# Patient Record
Sex: FEMALE | Race: OTHER
[De-identification: names, ages, dates, MRNs, and addresses within clinical notes are randomized per-mention and may not be internally consistent; named-entity substitution may affect disease eponyms.]

---

## 2020-12-24 ENCOUNTER — HOSPITAL ENCOUNTER (INPATIENT)
Dept: HOSPITAL 54 - ER | Age: 50
LOS: 8 days | Discharge: NURSING, REHAB OR LONG-TERM CARE | DRG: 194 | End: 2021-01-01
Attending: NURSE PRACTITIONER | Admitting: INTERNAL MEDICINE
Payer: COMMERCIAL

## 2020-12-24 VITALS — WEIGHT: 293 LBS | HEIGHT: 68 IN | BODY MASS INDEX: 44.41 KG/M2

## 2020-12-24 DIAGNOSIS — J96.21: ICD-10-CM

## 2020-12-24 DIAGNOSIS — Z71.3: ICD-10-CM
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
## 2020-12-24 DIAGNOSIS — E44.0: ICD-10-CM

## 2020-12-24 DIAGNOSIS — J96.22: ICD-10-CM

## 2020-12-24 DIAGNOSIS — J44.1: ICD-10-CM

## 2020-12-24 DIAGNOSIS — D50.9: ICD-10-CM

## 2020-12-24 DIAGNOSIS — I87.2: ICD-10-CM

## 2020-12-24 DIAGNOSIS — L03.116: ICD-10-CM

## 2020-12-24 DIAGNOSIS — I11.0: Primary | ICD-10-CM

## 2020-12-24 DIAGNOSIS — G93.41: ICD-10-CM

## 2020-12-24 DIAGNOSIS — D68.69: ICD-10-CM

## 2020-12-24 DIAGNOSIS — L03.115: ICD-10-CM

## 2020-12-24 DIAGNOSIS — M20.41: ICD-10-CM
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
## 2020-12-24 DIAGNOSIS — I50.33: ICD-10-CM

## 2020-12-24 DIAGNOSIS — I87.311: ICD-10-CM

## 2020-12-24 DIAGNOSIS — L60.3: ICD-10-CM

## 2020-12-24 DIAGNOSIS — Z99.81: ICD-10-CM

## 2020-12-24 DIAGNOSIS — M20.42: ICD-10-CM
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
## 2020-12-24 DIAGNOSIS — Z20.828: ICD-10-CM

## 2020-12-24 DIAGNOSIS — L97.818: ICD-10-CM

## 2020-12-24 DIAGNOSIS — E66.2: ICD-10-CM

## 2020-12-24 LAB
ALBUMIN SERPL BCP-MCNC: 3 G/DL (ref 3.4–5)
ALP SERPL-CCNC: 90 U/L (ref 46–116)
ALT SERPL W P-5'-P-CCNC: < 6 U/L (ref 12–78)
AST SERPL W P-5'-P-CCNC: 14 U/L (ref 15–37)
BASE EXCESS BLDA CALC-SCNC: 1.6 MMOL/L
BASE EXCESS BLDA CALC-SCNC: 2.2 MMOL/L
BASOPHILS # BLD AUTO: 0 /CMM (ref 0–0.2)
BASOPHILS NFR BLD AUTO: 0.6 % (ref 0–2)
BILIRUB DIRECT SERPL-MCNC: 0.5 MG/DL (ref 0–0.2)
BILIRUB SERPL-MCNC: 0.9 MG/DL (ref 0.2–1)
BUN SERPL-MCNC: 14 MG/DL (ref 7–18)
CALCIUM SERPL-MCNC: 8.8 MG/DL (ref 8.5–10.1)
CHLORIDE SERPL-SCNC: 106 MMOL/L (ref 98–107)
CHOLEST SERPL-MCNC: 74 MG/DL (ref ?–200)
CO2 SERPL-SCNC: 35 MMOL/L (ref 21–32)
CREAT SERPL-MCNC: 1.3 MG/DL (ref 0.6–1.3)
DO-HGB MFR BLDA: 213.4 MMHG
DO-HGB MFR BLDA: 354.5 MMHG
EOSINOPHIL NFR BLD AUTO: 3.4 % (ref 0–6)
EOSINOPHIL NFR BLD MANUAL: 3 % (ref 0–4)
GLUCOSE SERPL-MCNC: 93 MG/DL (ref 74–106)
HCT VFR BLD AUTO: 33 % (ref 33–45)
HDLC SERPL-MCNC: 36 MG/DL (ref 40–60)
HGB BLD-MCNC: 9.3 G/DL (ref 11.5–14.8)
INHALED O2 CONCENTRATION: 60 %
INHALED O2 CONCENTRATION: 80 %
IRON SERPL-MCNC: 21 UG/DL (ref 50–175)
LDLC SERPL DIRECT ASSAY-MCNC: 22 MG/DL (ref 0–99)
LYMPHOCYTES NFR BLD AUTO: 0.5 /CMM (ref 0.8–4.8)
LYMPHOCYTES NFR BLD AUTO: 11 % (ref 20–44)
LYMPHOCYTES NFR BLD MANUAL: 16 % (ref 16–48)
MCHC RBC AUTO-ENTMCNC: 28 G/DL (ref 31–36)
MCV RBC AUTO: 71 FL (ref 82–100)
MONOCYTES NFR BLD AUTO: 0.4 /CMM (ref 0.1–1.3)
MONOCYTES NFR BLD AUTO: 9.3 % (ref 2–12)
MONOCYTES NFR BLD MANUAL: 12 % (ref 0–11)
NEUTROPHILS # BLD AUTO: 3.5 /CMM (ref 1.8–8.9)
NEUTROPHILS NFR BLD AUTO: 75.7 % (ref 43–81)
NEUTS SEG NFR BLD MANUAL: 69 % (ref 42–76)
NT-PROBNP SERPL-MCNC: 5975 PG/ML (ref 0–125)
PCO2 TEMP ADJ BLDA: 86 MMHG (ref 35–45)
PCO2 TEMP ADJ BLDA: 98.7 MMHG (ref 35–45)
PH TEMP ADJ BLDA: 7.14 [PH] (ref 7.35–7.45)
PH TEMP ADJ BLDA: 7.18 [PH] (ref 7.35–7.45)
PLATELET # BLD AUTO: 174 /CMM (ref 150–450)
PO2 TEMP ADJ BLDA: 112.3 MMHG (ref 75–100)
PO2 TEMP ADJ BLDA: 119.8 MMHG (ref 75–100)
POTASSIUM SERPL-SCNC: 4.1 MMOL/L (ref 3.5–5.1)
PROT SERPL-MCNC: 8.2 G/DL (ref 6.4–8.2)
RBC # BLD AUTO: 4.69 MIL/UL (ref 4–5.2)
SAO2 % BLDA: 97.1 % (ref 92–98.5)
SAO2 % BLDA: 97.7 % (ref 92–98.5)
SET RATE, BG: 16
SET RATE, BG: 24
SODIUM SERPL-SCNC: 144 MMOL/L (ref 136–145)
TIBC SERPL-MCNC: 433 UG/DL (ref 250–450)
TRIGL SERPL-MCNC: 79 MG/DL (ref 30–150)
TSH SERPL DL<=0.005 MIU/L-ACNC: 2.33 UIU/ML (ref 0.36–3.74)
VENTILATION MODE VENT: (no result)
VENTILATION MODE VENT: (no result)
WBC NRBC COR # BLD AUTO: 4.6 K/UL (ref 4.3–11)

## 2020-12-24 PROCEDURE — A6253 ABSORPT DRG > 48 SQ IN W/O B: HCPCS

## 2020-12-24 PROCEDURE — G0378 HOSPITAL OBSERVATION PER HR: HCPCS

## 2020-12-24 PROCEDURE — U0003 INFECTIOUS AGENT DETECTION BY NUCLEIC ACID (DNA OR RNA); SEVERE ACUTE RESPIRATORY SYNDROME CORONAVIRUS 2 (SARS-COV-2) (CORONAVIRUS DISEASE [COVID-19]), AMPLIFIED PROBE TECHNIQUE, MAKING USE OF HIGH THROUGHPUT TECHNOLOGIES AS DESCRIBED BY CMS-2020-01-R: HCPCS

## 2020-12-24 PROCEDURE — C9113 INJ PANTOPRAZOLE SODIUM, VIA: HCPCS

## 2020-12-24 PROCEDURE — 5A09357 ASSISTANCE WITH RESPIRATORY VENTILATION, LESS THAN 24 CONSECUTIVE HOURS, CONTINUOUS POSITIVE AIRWAY PRESSURE: ICD-10-PCS | Performed by: INTERNAL MEDICINE

## 2020-12-24 PROCEDURE — A6403 STERILE GAUZE>16 <= 48 SQ IN: HCPCS

## 2020-12-24 PROCEDURE — C9803 HOPD COVID-19 SPEC COLLECT: HCPCS

## 2020-12-24 RX ADMIN — FUROSEMIDE SCH MG: 10 INJECTION, SOLUTION INTRAMUSCULAR; INTRAVENOUS at 10:00

## 2020-12-24 RX ADMIN — ENOXAPARIN SODIUM SCH MG: 40 INJECTION SUBCUTANEOUS at 12:40

## 2020-12-24 RX ADMIN — FUROSEMIDE SCH MG: 10 INJECTION, SOLUTION INTRAMUSCULAR; INTRAVENOUS at 16:03

## 2020-12-24 RX ADMIN — ALBUTEROL SULFATE SCH MG: 2.5 SOLUTION RESPIRATORY (INHALATION) at 15:23

## 2020-12-24 RX ADMIN — DEXTROSE MONOHYDRATE SCH MLS/HR: 50 INJECTION, SOLUTION INTRAVENOUS at 12:30

## 2020-12-24 RX ADMIN — POTASSIUM CHLORIDE SCH MEQ: 1500 TABLET, EXTENDED RELEASE ORAL at 10:00

## 2020-12-24 RX ADMIN — ALBUTEROL SULFATE SCH MG: 2.5 SOLUTION RESPIRATORY (INHALATION) at 12:00

## 2020-12-24 RX ADMIN — Medication SCH MG: at 20:00

## 2020-12-24 RX ADMIN — Medication SCH MG: at 15:23

## 2020-12-24 RX ADMIN — ALBUTEROL SULFATE SCH MG: 2.5 SOLUTION RESPIRATORY (INHALATION) at 20:00

## 2020-12-24 RX ADMIN — CEFEPIME HYDROCHLORIDE SCH MLS/HR: 1 INJECTION, POWDER, FOR SOLUTION INTRAMUSCULAR; INTRAVENOUS at 11:28

## 2020-12-24 RX ADMIN — POTASSIUM CHLORIDE SCH MEQ: 1500 TABLET, EXTENDED RELEASE ORAL at 16:05

## 2020-12-24 RX ADMIN — SODIUM CHLORIDE SCH MG: 9 INJECTION, SOLUTION INTRAVENOUS at 10:00

## 2020-12-24 RX ADMIN — Medication SCH MG: at 12:00

## 2020-12-24 NOTE — NUR
repeat abgs reported to dr. lanier. 

ph 7.144

co2 98.7

o2 112.3



pt currently on bibap with settings of

pressure 25/8

rate 16

fio2 80%

## 2020-12-24 NOTE — NUR
-------------------------------------------------------------------------------

             *** Note undone in [facility] - 12/24/20 at 0506 by WONG ***             

-------------------------------------------------------------------------------

COVID SWABBED, SENT TO LAB.

## 2020-12-24 NOTE — NUR
RT



pt found on 3lnc saturating 94%. placed on bipap, more awake and alert. tolerating well. 
ambu bag at [facility].



will continue to monitor

## 2020-12-25 LAB
ALBUMIN SERPL BCP-MCNC: 2.7 G/DL (ref 3.4–5)
ALP SERPL-CCNC: 91 U/L (ref 46–116)
ALT SERPL W P-5'-P-CCNC: 8 U/L (ref 12–78)
AST SERPL W P-5'-P-CCNC: 8 U/L (ref 15–37)
BASE EXCESS BLDA CALC-SCNC: 3.3 MMOL/L
BASE EXCESS BLDA CALC-SCNC: 5.1 MMOL/L
BASOPHILS # BLD AUTO: 0 /CMM (ref 0–0.2)
BASOPHILS NFR BLD AUTO: 0.2 % (ref 0–2)
BILIRUB SERPL-MCNC: 0.8 MG/DL (ref 0.2–1)
BUN SERPL-MCNC: 13 MG/DL (ref 7–18)
CALCIUM SERPL-MCNC: 8.3 MG/DL (ref 8.5–10.1)
CHLORIDE SERPL-SCNC: 106 MMOL/L (ref 98–107)
CO2 SERPL-SCNC: 35 MMOL/L (ref 21–32)
CREAT SERPL-MCNC: 1.5 MG/DL (ref 0.6–1.3)
DO-HGB MFR BLDA: 37.4 MMHG
DO-HGB MFR BLDA: 57.6 MMHG
EOSINOPHIL NFR BLD AUTO: 2.1 % (ref 0–6)
EOSINOPHIL NFR BLD MANUAL: 6 % (ref 0–4)
GLUCOSE SERPL-MCNC: 121 MG/DL (ref 74–106)
HCT VFR BLD AUTO: 32 % (ref 33–45)
HGB BLD-MCNC: 9.1 G/DL (ref 11.5–14.8)
INHALED O2 CONCENTRATION: 28 %
INHALED O2 CONCENTRATION: 28 %
INHALED O2 FLOW RATE: 2 L/MIN (ref 0–30)
INHALED O2 FLOW RATE: 2 L/MIN (ref 0–30)
LYMPHOCYTES NFR BLD AUTO: 0.3 /CMM (ref 0.8–4.8)
LYMPHOCYTES NFR BLD AUTO: 7.9 % (ref 20–44)
LYMPHOCYTES NFR BLD MANUAL: 3 % (ref 16–48)
MCHC RBC AUTO-ENTMCNC: 28 G/DL (ref 31–36)
MCV RBC AUTO: 71 FL (ref 82–100)
MONOCYTES NFR BLD AUTO: 0.3 /CMM (ref 0.1–1.3)
MONOCYTES NFR BLD AUTO: 7.4 % (ref 2–12)
MONOCYTES NFR BLD MANUAL: 6 % (ref 0–11)
NEUTROPHILS # BLD AUTO: 3.4 /CMM (ref 1.8–8.9)
NEUTROPHILS NFR BLD AUTO: 82.4 % (ref 43–81)
NEUTS SEG NFR BLD MANUAL: 85 % (ref 42–76)
PCO2 TEMP ADJ BLDA: 70.8 MMHG (ref 35–45)
PCO2 TEMP ADJ BLDA: 79.6 MMHG (ref 35–45)
PH TEMP ADJ BLDA: 7.25 [PH] (ref 7.35–7.45)
PH TEMP ADJ BLDA: 7.27 [PH] (ref 7.35–7.45)
PLATELET # BLD AUTO: 145 /CMM (ref 150–450)
PO2 TEMP ADJ BLDA: 58.5 MMHG (ref 75–100)
PO2 TEMP ADJ BLDA: 68.3 MMHG (ref 75–100)
POTASSIUM SERPL-SCNC: 4.1 MMOL/L (ref 3.5–5.1)
PROT SERPL-MCNC: 7.7 G/DL (ref 6.4–8.2)
RBC # BLD AUTO: 4.56 MIL/UL (ref 4–5.2)
SAO2 % BLDA: 88.2 % (ref 92–98.5)
SAO2 % BLDA: 91.8 % (ref 92–98.5)
SODIUM SERPL-SCNC: 144 MMOL/L (ref 136–145)
VENTILATION MODE VENT: (no result)
VENTILATION MODE VENT: (no result)
WBC NRBC COR # BLD AUTO: 4.2 K/UL (ref 4.3–11)

## 2020-12-25 RX ADMIN — Medication SCH MG: at 16:00

## 2020-12-25 RX ADMIN — HYDROMORPHONE HYDROCHLORIDE PRN MG: 1 INJECTION, SOLUTION INTRAMUSCULAR; INTRAVENOUS; SUBCUTANEOUS at 18:22

## 2020-12-25 RX ADMIN — FUROSEMIDE SCH MG: 10 INJECTION, SOLUTION INTRAMUSCULAR; INTRAVENOUS at 16:05

## 2020-12-25 RX ADMIN — ALBUTEROL SULFATE SCH MG: 2.5 SOLUTION RESPIRATORY (INHALATION) at 00:11

## 2020-12-25 RX ADMIN — Medication SCH MG: at 04:22

## 2020-12-25 RX ADMIN — DEXTROSE MONOHYDRATE PRN MG: 50 INJECTION, SOLUTION INTRAVENOUS at 20:06

## 2020-12-25 RX ADMIN — Medication SCH MG: at 20:00

## 2020-12-25 RX ADMIN — ENOXAPARIN SODIUM SCH MG: 40 INJECTION SUBCUTANEOUS at 08:33

## 2020-12-25 RX ADMIN — DEXTROSE MONOHYDRATE SCH MLS/HR: 50 INJECTION, SOLUTION INTRAVENOUS at 07:05

## 2020-12-25 RX ADMIN — SODIUM CHLORIDE SCH MLS/HR: 9 INJECTION, SOLUTION INTRAVENOUS at 14:07

## 2020-12-25 RX ADMIN — ALBUTEROL SULFATE SCH MG: 2.5 SOLUTION RESPIRATORY (INHALATION) at 18:37

## 2020-12-25 RX ADMIN — Medication SCH MG: at 00:11

## 2020-12-25 RX ADMIN — ALBUTEROL SULFATE SCH MG: 2.5 SOLUTION RESPIRATORY (INHALATION) at 08:00

## 2020-12-25 RX ADMIN — ALBUTEROL SULFATE SCH MG: 2.5 SOLUTION RESPIRATORY (INHALATION) at 20:00

## 2020-12-25 RX ADMIN — ALBUTEROL SULFATE SCH MG: 2.5 SOLUTION RESPIRATORY (INHALATION) at 04:22

## 2020-12-25 RX ADMIN — ALBUTEROL SULFATE SCH MG: 2.5 SOLUTION RESPIRATORY (INHALATION) at 13:00

## 2020-12-25 RX ADMIN — CEFEPIME HYDROCHLORIDE SCH MLS/HR: 1 INJECTION, POWDER, FOR SOLUTION INTRAMUSCULAR; INTRAVENOUS at 11:39

## 2020-12-25 RX ADMIN — Medication SCH MG: at 13:00

## 2020-12-25 RX ADMIN — POTASSIUM CHLORIDE SCH MEQ: 1500 TABLET, EXTENDED RELEASE ORAL at 08:33

## 2020-12-25 RX ADMIN — SODIUM CHLORIDE SCH MG: 9 INJECTION, SOLUTION INTRAVENOUS at 08:33

## 2020-12-25 RX ADMIN — POTASSIUM CHLORIDE SCH MEQ: 1500 TABLET, EXTENDED RELEASE ORAL at 16:05

## 2020-12-25 RX ADMIN — Medication SCH MG: at 08:00

## 2020-12-25 RX ADMIN — DEXTROSE MONOHYDRATE PRN MG: 50 INJECTION, SOLUTION INTRAVENOUS at 14:00

## 2020-12-25 RX ADMIN — FUROSEMIDE SCH MG: 10 INJECTION, SOLUTION INTRAMUSCULAR; INTRAVENOUS at 08:33

## 2020-12-25 NOTE — NUR
provided pt with dinner but she states that she doesnt want to eat. turned and 
repositioned,. in bed.

## 2020-12-25 NOTE — NUR
RT



pt placed on noc bipap per md order. tolerating bipap well at this time. will continue to 
monitor throughout the night

## 2020-12-25 NOTE — NUR
adl care provided. changed pts bed sheets provided with warm blanket. 
repositioned in bed, kept clean and dry and comfortable.

## 2020-12-25 NOTE — NUR
PT WAS PLACED ON BIPAP. PT KEPT TAKING THE BIPAP OFF. INSTRUCTED TO KEEP THE 
BIPAP ON. SAT 97% ON BIPAP.

## 2020-12-25 NOTE — NUR
PT REMAINS IN BED, ON MONITOR, AND PULSE OX. PT STATED "WHEN IS BREAKFAST 
COMING?" 

PT UPDATED WITH BREAKFAST TIME.

## 2020-12-26 LAB
BASE EXCESS BLDA CALC-SCNC: 11.1 MMOL/L
BASE EXCESS BLDA CALC-SCNC: 7.3 MMOL/L
BASOPHILS # BLD AUTO: 0 /CMM (ref 0–0.2)
BASOPHILS NFR BLD AUTO: 0.3 % (ref 0–2)
BUN SERPL-MCNC: 13 MG/DL (ref 7–18)
CALCIUM SERPL-MCNC: 8 MG/DL (ref 8.5–10.1)
CHLORIDE SERPL-SCNC: 102 MMOL/L (ref 98–107)
CO2 SERPL-SCNC: 37 MMOL/L (ref 21–32)
CREAT SERPL-MCNC: 1.4 MG/DL (ref 0.6–1.3)
DO-HGB MFR BLDA: 51.1 MMHG
DO-HGB MFR BLDA: 99.5 MMHG
EOSINOPHIL NFR BLD AUTO: 2.5 % (ref 0–6)
GLUCOSE SERPL-MCNC: 86 MG/DL (ref 74–106)
HCT VFR BLD AUTO: 30 % (ref 33–45)
HGB BLD-MCNC: 8.6 G/DL (ref 11.5–14.8)
INHALED O2 CONCENTRATION: 28 %
INHALED O2 CONCENTRATION: 30 %
INHALED O2 FLOW RATE: 2 L/MIN (ref 0–30)
LYMPHOCYTES NFR BLD AUTO: 0.5 /CMM (ref 0.8–4.8)
LYMPHOCYTES NFR BLD AUTO: 12.1 % (ref 20–44)
LYMPHOCYTES NFR BLD MANUAL: 13 % (ref 16–48)
MAGNESIUM SERPL-MCNC: 1.9 MG/DL (ref 1.8–2.4)
MCHC RBC AUTO-ENTMCNC: 29 G/DL (ref 31–36)
MCV RBC AUTO: 70 FL (ref 82–100)
MONOCYTES NFR BLD AUTO: 0.3 /CMM (ref 0.1–1.3)
MONOCYTES NFR BLD AUTO: 8 % (ref 2–12)
MONOCYTES NFR BLD MANUAL: 6 % (ref 0–11)
NEUTROPHILS # BLD AUTO: 2.9 /CMM (ref 1.8–8.9)
NEUTROPHILS NFR BLD AUTO: 77.1 % (ref 43–81)
NEUTS SEG NFR BLD MANUAL: 81 % (ref 42–76)
PCO2 TEMP ADJ BLDA: 55.9 MMHG (ref 35–45)
PCO2 TEMP ADJ BLDA: 67.9 MMHG (ref 35–45)
PH TEMP ADJ BLDA: 7.37 [PH] (ref 7.35–7.45)
PH TEMP ADJ BLDA: 7.39 [PH] (ref 7.35–7.45)
PHOSPHATE SERPL-MCNC: 3.8 MG/DL (ref 2.5–4.9)
PLATELET # BLD AUTO: 118 /CMM (ref 150–450)
PO2 TEMP ADJ BLDA: 48.7 MMHG (ref 75–100)
PO2 TEMP ADJ BLDA: 68.4 MMHG (ref 75–100)
POTASSIUM SERPL-SCNC: 4.1 MMOL/L (ref 3.5–5.1)
RBC # BLD AUTO: 4.29 MIL/UL (ref 4–5.2)
SAO2 % BLDA: 83.7 % (ref 92–98.5)
SAO2 % BLDA: 92.9 % (ref 92–98.5)
SODIUM SERPL-SCNC: 141 MMOL/L (ref 136–145)
VENTILATION MODE VENT: (no result)
WBC NRBC COR # BLD AUTO: 3.8 K/UL (ref 4.3–11)

## 2020-12-26 RX ADMIN — Medication SCH MG: at 11:38

## 2020-12-26 RX ADMIN — Medication SCH MG: at 08:19

## 2020-12-26 RX ADMIN — POTASSIUM CHLORIDE SCH MEQ: 1500 TABLET, EXTENDED RELEASE ORAL at 09:28

## 2020-12-26 RX ADMIN — CEFEPIME HYDROCHLORIDE SCH MLS/HR: 1 INJECTION, POWDER, FOR SOLUTION INTRAMUSCULAR; INTRAVENOUS at 11:30

## 2020-12-26 RX ADMIN — ALBUTEROL SULFATE SCH MG: 2.5 SOLUTION RESPIRATORY (INHALATION) at 15:58

## 2020-12-26 RX ADMIN — ALBUTEROL SULFATE SCH MG: 2.5 SOLUTION RESPIRATORY (INHALATION) at 03:34

## 2020-12-26 RX ADMIN — Medication SCH MG: at 20:34

## 2020-12-26 RX ADMIN — POTASSIUM CHLORIDE SCH MEQ: 1500 TABLET, EXTENDED RELEASE ORAL at 17:06

## 2020-12-26 RX ADMIN — ALBUTEROL SULFATE SCH MG: 2.5 SOLUTION RESPIRATORY (INHALATION) at 00:53

## 2020-12-26 RX ADMIN — PANTOPRAZOLE SODIUM SCH MG: 40 TABLET, DELAYED RELEASE ORAL at 08:15

## 2020-12-26 RX ADMIN — FUROSEMIDE SCH MG: 10 INJECTION, SOLUTION INTRAMUSCULAR; INTRAVENOUS at 09:28

## 2020-12-26 RX ADMIN — Medication SCH MG: at 00:53

## 2020-12-26 RX ADMIN — ALBUTEROL SULFATE SCH MG: 2.5 SOLUTION RESPIRATORY (INHALATION) at 20:34

## 2020-12-26 RX ADMIN — HYDROMORPHONE HYDROCHLORIDE PRN MG: 1 INJECTION, SOLUTION INTRAMUSCULAR; INTRAVENOUS; SUBCUTANEOUS at 00:49

## 2020-12-26 RX ADMIN — Medication PRN MG: at 20:09

## 2020-12-26 RX ADMIN — FUROSEMIDE SCH MG: 10 INJECTION, SOLUTION INTRAMUSCULAR; INTRAVENOUS at 19:28

## 2020-12-26 RX ADMIN — ALBUTEROL SULFATE SCH MG: 2.5 SOLUTION RESPIRATORY (INHALATION) at 08:19

## 2020-12-26 RX ADMIN — ENOXAPARIN SODIUM SCH MG: 40 INJECTION SUBCUTANEOUS at 09:34

## 2020-12-26 RX ADMIN — FUROSEMIDE SCH MG: 10 INJECTION, SOLUTION INTRAMUSCULAR; INTRAVENOUS at 17:00

## 2020-12-26 RX ADMIN — ALBUTEROL SULFATE SCH MG: 2.5 SOLUTION RESPIRATORY (INHALATION) at 11:38

## 2020-12-26 RX ADMIN — SODIUM CHLORIDE SCH MLS/HR: 9 INJECTION, SOLUTION INTRAVENOUS at 14:55

## 2020-12-26 RX ADMIN — DEXTROSE MONOHYDRATE SCH MLS/HR: 50 INJECTION, SOLUTION INTRAVENOUS at 19:29

## 2020-12-26 RX ADMIN — DEXTROSE MONOHYDRATE SCH MLS/HR: 50 INJECTION, SOLUTION INTRAVENOUS at 00:30

## 2020-12-26 RX ADMIN — Medication SCH MG: at 15:58

## 2020-12-26 RX ADMIN — Medication SCH MG: at 03:34

## 2020-12-27 LAB
ALBUMIN SERPL BCP-MCNC: 2.4 G/DL (ref 3.4–5)
ALP SERPL-CCNC: 73 U/L (ref 46–116)
ALT SERPL W P-5'-P-CCNC: 9 U/L (ref 12–78)
AST SERPL W P-5'-P-CCNC: 8 U/L (ref 15–37)
BASOPHILS # BLD AUTO: 0 /CMM (ref 0–0.2)
BASOPHILS NFR BLD AUTO: 0.3 % (ref 0–2)
BILIRUB SERPL-MCNC: 0.9 MG/DL (ref 0.2–1)
BUN SERPL-MCNC: 14 MG/DL (ref 7–18)
CALCIUM SERPL-MCNC: 7.8 MG/DL (ref 8.5–10.1)
CHLORIDE SERPL-SCNC: 99 MMOL/L (ref 98–107)
CO2 SERPL-SCNC: 42 MMOL/L (ref 21–32)
CREAT SERPL-MCNC: 1.4 MG/DL (ref 0.6–1.3)
EOSINOPHIL NFR BLD AUTO: 1.6 % (ref 0–6)
EOSINOPHIL NFR BLD MANUAL: 1 % (ref 0–4)
GLUCOSE SERPL-MCNC: 94 MG/DL (ref 74–106)
HCT VFR BLD AUTO: 29 % (ref 33–45)
HGB BLD-MCNC: 8.4 G/DL (ref 11.5–14.8)
LYMPHOCYTES NFR BLD AUTO: 0.7 /CMM (ref 0.8–4.8)
LYMPHOCYTES NFR BLD AUTO: 15.6 % (ref 20–44)
LYMPHOCYTES NFR BLD MANUAL: 17 % (ref 16–48)
MAGNESIUM SERPL-MCNC: 1.9 MG/DL (ref 1.8–2.4)
MCHC RBC AUTO-ENTMCNC: 29 G/DL (ref 31–36)
MCV RBC AUTO: 68 FL (ref 82–100)
MONOCYTES NFR BLD AUTO: 0.4 /CMM (ref 0.1–1.3)
MONOCYTES NFR BLD AUTO: 9.2 % (ref 2–12)
MONOCYTES NFR BLD MANUAL: 5 % (ref 0–11)
NEUTROPHILS # BLD AUTO: 3.2 /CMM (ref 1.8–8.9)
NEUTROPHILS NFR BLD AUTO: 73.3 % (ref 43–81)
NEUTS SEG NFR BLD MANUAL: 77 % (ref 42–76)
PHOSPHATE SERPL-MCNC: 3.1 MG/DL (ref 2.5–4.9)
PLATELET # BLD AUTO: 111 /CMM (ref 150–450)
POTASSIUM SERPL-SCNC: 4 MMOL/L (ref 3.5–5.1)
PROT SERPL-MCNC: 7.1 G/DL (ref 6.4–8.2)
RBC # BLD AUTO: 4.22 MIL/UL (ref 4–5.2)
SODIUM SERPL-SCNC: 139 MMOL/L (ref 136–145)
WBC NRBC COR # BLD AUTO: 4.3 K/UL (ref 4.3–11)

## 2020-12-27 PROCEDURE — 05H533Z INSERTION OF INFUSION DEVICE INTO RIGHT SUBCLAVIAN VEIN, PERCUTANEOUS APPROACH: ICD-10-PCS | Performed by: NURSE PRACTITIONER

## 2020-12-27 PROCEDURE — B546ZZA ULTRASONOGRAPHY OF RIGHT SUBCLAVIAN VEIN, GUIDANCE: ICD-10-PCS | Performed by: NURSE PRACTITIONER

## 2020-12-27 RX ADMIN — Medication SCH MG: at 03:23

## 2020-12-27 RX ADMIN — ALBUTEROL SULFATE SCH MG: 2.5 SOLUTION RESPIRATORY (INHALATION) at 16:21

## 2020-12-27 RX ADMIN — Medication SCH MG: at 00:00

## 2020-12-27 RX ADMIN — ALBUTEROL SULFATE SCH MG: 2.5 SOLUTION RESPIRATORY (INHALATION) at 00:00

## 2020-12-27 RX ADMIN — ALBUTEROL SULFATE SCH MG: 2.5 SOLUTION RESPIRATORY (INHALATION) at 12:00

## 2020-12-27 RX ADMIN — Medication SCH MG: at 12:00

## 2020-12-27 RX ADMIN — DEXTROSE MONOHYDRATE PRN MG: 50 INJECTION, SOLUTION INTRAVENOUS at 09:39

## 2020-12-27 RX ADMIN — Medication SCH MG: at 12:14

## 2020-12-27 RX ADMIN — HYDROCODONE BITARTRATE AND ACETAMINOPHEN PRN TAB: 10; 325 TABLET ORAL at 21:10

## 2020-12-27 RX ADMIN — PANTOPRAZOLE SODIUM SCH MG: 40 TABLET, DELAYED RELEASE ORAL at 08:29

## 2020-12-27 RX ADMIN — Medication PRN MG: at 03:26

## 2020-12-27 RX ADMIN — Medication SCH MG: at 19:37

## 2020-12-27 RX ADMIN — Medication SCH MG: at 16:21

## 2020-12-27 RX ADMIN — Medication SCH MG: at 10:52

## 2020-12-27 RX ADMIN — CEFEPIME HYDROCHLORIDE SCH MLS/HR: 1 INJECTION, POWDER, FOR SOLUTION INTRAMUSCULAR; INTRAVENOUS at 11:52

## 2020-12-27 RX ADMIN — SODIUM CHLORIDE SCH MLS/HR: 9 INJECTION, SOLUTION INTRAVENOUS at 15:29

## 2020-12-27 RX ADMIN — DEXTROSE MONOHYDRATE PRN MG: 50 INJECTION, SOLUTION INTRAVENOUS at 03:26

## 2020-12-27 RX ADMIN — ALBUTEROL SULFATE SCH MG: 2.5 SOLUTION RESPIRATORY (INHALATION) at 03:23

## 2020-12-27 RX ADMIN — HYDROCODONE BITARTRATE AND ACETAMINOPHEN PRN TAB: 10; 325 TABLET ORAL at 15:53

## 2020-12-27 RX ADMIN — ENOXAPARIN SODIUM SCH MG: 40 INJECTION SUBCUTANEOUS at 09:47

## 2020-12-27 RX ADMIN — Medication PRN MG: at 09:40

## 2020-12-27 RX ADMIN — DEXTROSE MONOHYDRATE SCH MLS/HR: 50 INJECTION, SOLUTION INTRAVENOUS at 12:45

## 2020-12-27 RX ADMIN — ALBUTEROL SULFATE SCH MG: 2.5 SOLUTION RESPIRATORY (INHALATION) at 10:52

## 2020-12-27 RX ADMIN — ALBUTEROL SULFATE SCH MG: 2.5 SOLUTION RESPIRATORY (INHALATION) at 19:37

## 2020-12-27 NOTE — NUR
PATIENT REPOSITIONED BACK TO BED. PATIENT IS CURRENTLY WITH RT AT BEDSIDE 
RECEIVING BREATHING TREATMENT.

## 2020-12-27 NOTE — NUR
PER PT, PATIENT ABLE TO SIT AT THE SIDE OF THE BED, STAND UP FROM THERE AND 
MAKE FEW STEPS SIDEWAYS USING WALKER.

## 2020-12-27 NOTE — NUR
DR MANCUSO ORDERED TO DC MORPHINE 4MG Q6 PRN AND BILATERAL LOWER EXTREMITY 
ARTERIAL MALORIE. CARRIED OUT

## 2020-12-27 NOTE — NUR
DR MANCUSO AT BEDSIDE TO CHECK PATIENT. WITH NEW ORDERS OF PT EVAL, WOUND EVAL, 
NORCO 10-325MG QA4 PRN FOR MOD-SEVERE PAIN, COLACE 100MG DAILY AND SENNA 8.6MG 
Q DAILY PRN. CARRIED OUT.

## 2020-12-28 LAB
*HGBFRC HEMOGLOBIN C: 0 %
*HGBFRCHEMOGLOBIN VARIANT: 0 %
BUN SERPL-MCNC: 13 MG/DL (ref 7–18)
CALCIUM SERPL-MCNC: 8.3 MG/DL (ref 8.5–10.1)
CHLORIDE SERPL-SCNC: 97 MMOL/L (ref 98–107)
CO2 SERPL-SCNC: 40 MMOL/L (ref 21–32)
CREAT SERPL-MCNC: 1.1 MG/DL (ref 0.6–1.3)
GLUCOSE SERPL-MCNC: 91 MG/DL (ref 74–106)
HGB A2 MFR BLD COLUMN CHROM: 1.4 % (ref 1.8–3.2)
HGB F MFR BLD: 0 % (ref 0–2)
HGB S MFR BLD: 0 %
POTASSIUM SERPL-SCNC: 4.1 MMOL/L (ref 3.5–5.1)
SODIUM SERPL-SCNC: 139 MMOL/L (ref 136–145)

## 2020-12-28 RX ADMIN — HYDROCODONE BITARTRATE AND ACETAMINOPHEN PRN TAB: 10; 325 TABLET ORAL at 08:09

## 2020-12-28 RX ADMIN — Medication SCH MG: at 09:25

## 2020-12-28 RX ADMIN — Medication SCH MG: at 00:00

## 2020-12-28 RX ADMIN — ACETAMINOPHEN PRN MG: 160 SOLUTION ORAL at 16:11

## 2020-12-28 RX ADMIN — Medication SCH MG: at 12:00

## 2020-12-28 RX ADMIN — Medication PRN MG: at 21:59

## 2020-12-28 RX ADMIN — ALBUTEROL SULFATE SCH MG: 2.5 SOLUTION RESPIRATORY (INHALATION) at 03:19

## 2020-12-28 RX ADMIN — ALBUTEROL SULFATE SCH MG: 2.5 SOLUTION RESPIRATORY (INHALATION) at 00:00

## 2020-12-28 RX ADMIN — HYDROCODONE BITARTRATE AND ACETAMINOPHEN PRN TAB: 10; 325 TABLET ORAL at 13:50

## 2020-12-28 RX ADMIN — PANTOPRAZOLE SODIUM SCH MG: 40 TABLET, DELAYED RELEASE ORAL at 08:30

## 2020-12-28 RX ADMIN — SODIUM CHLORIDE SCH MLS/HR: 9 INJECTION, SOLUTION INTRAVENOUS at 13:51

## 2020-12-28 RX ADMIN — DEXTROSE MONOHYDRATE PRN MG: 50 INJECTION, SOLUTION INTRAVENOUS at 03:29

## 2020-12-28 RX ADMIN — HYDROCODONE BITARTRATE AND ACETAMINOPHEN PRN TAB: 10; 325 TABLET ORAL at 03:29

## 2020-12-28 RX ADMIN — Medication PRN MG: at 03:30

## 2020-12-28 RX ADMIN — ALBUTEROL SULFATE SCH MG: 2.5 SOLUTION RESPIRATORY (INHALATION) at 20:13

## 2020-12-28 RX ADMIN — ENOXAPARIN SODIUM SCH MG: 40 INJECTION SUBCUTANEOUS at 08:15

## 2020-12-28 RX ADMIN — ALBUTEROL SULFATE SCH MG: 2.5 SOLUTION RESPIRATORY (INHALATION) at 16:00

## 2020-12-28 RX ADMIN — DEXTROSE MONOHYDRATE SCH MLS/HR: 50 INJECTION, SOLUTION INTRAVENOUS at 06:07

## 2020-12-28 RX ADMIN — ALBUTEROL SULFATE SCH MG: 2.5 SOLUTION RESPIRATORY (INHALATION) at 23:00

## 2020-12-28 RX ADMIN — HYDROCODONE BITARTRATE AND ACETAMINOPHEN PRN TAB: 10; 325 TABLET ORAL at 22:04

## 2020-12-28 RX ADMIN — Medication SCH MG: at 23:00

## 2020-12-28 RX ADMIN — ALBUTEROL SULFATE SCH MG: 2.5 SOLUTION RESPIRATORY (INHALATION) at 08:13

## 2020-12-28 RX ADMIN — CEFEPIME HYDROCHLORIDE SCH MLS/HR: 1 INJECTION, POWDER, FOR SOLUTION INTRAMUSCULAR; INTRAVENOUS at 10:27

## 2020-12-28 RX ADMIN — Medication SCH MG: at 08:13

## 2020-12-28 RX ADMIN — ALBUTEROL SULFATE SCH MG: 2.5 SOLUTION RESPIRATORY (INHALATION) at 12:00

## 2020-12-28 RX ADMIN — Medication SCH MG: at 20:13

## 2020-12-28 RX ADMIN — Medication SCH MG: at 03:19

## 2020-12-28 RX ADMIN — DEXTROSE MONOHYDRATE PRN MG: 50 INJECTION, SOLUTION INTRAVENOUS at 10:35

## 2020-12-28 NOTE — NUR
RT



pt refuses to wear bipap. no sob, no resp distress. donna flores, aware. will continue to 
monitor.

## 2020-12-28 NOTE — NUR
PT IS REQUESTING SPONG BATH WHILE SITTING AT THE EDGE OF THE BED . PT WAS 
PROVIDED W/ SUPPLIES AND ENCOURAGED TO KEEP HER NC IN PLACE.

## 2020-12-28 NOTE — NUR
REC'D PT IN BED AWAKE AND ALERT. BREATHING EVENLY. NO SOB. NAD. PT WAS ADVISED 
TO KEEP THE NC IN PLACE AT ALL TIME. NO C.O PAIN OR DISCOMFORT AT THIS TIME. 
NEEDS ATTENED , ASSISTED W/ ADLS, BED LOW LOCKED . SR X2. CALL LIGHT WITHIN 
REACH. WILL CONT TO MONITOR ,

## 2020-12-29 VITALS — SYSTOLIC BLOOD PRESSURE: 129 MMHG | DIASTOLIC BLOOD PRESSURE: 75 MMHG

## 2020-12-29 VITALS — SYSTOLIC BLOOD PRESSURE: 132 MMHG | DIASTOLIC BLOOD PRESSURE: 78 MMHG

## 2020-12-29 VITALS — SYSTOLIC BLOOD PRESSURE: 128 MMHG | DIASTOLIC BLOOD PRESSURE: 92 MMHG

## 2020-12-29 LAB
BASOPHILS # BLD AUTO: 0 /CMM (ref 0–0.2)
BASOPHILS NFR BLD AUTO: 0.4 % (ref 0–2)
BUN SERPL-MCNC: 13 MG/DL (ref 7–18)
CALCIUM SERPL-MCNC: 8.2 MG/DL (ref 8.5–10.1)
CHLORIDE SERPL-SCNC: 98 MMOL/L (ref 98–107)
CO2 SERPL-SCNC: 38 MMOL/L (ref 21–32)
CREAT SERPL-MCNC: 1.1 MG/DL (ref 0.6–1.3)
EOSINOPHIL NFR BLD AUTO: 3.2 % (ref 0–6)
EOSINOPHIL NFR BLD MANUAL: 1 % (ref 0–4)
GLUCOSE SERPL-MCNC: 95 MG/DL (ref 74–106)
HCT VFR BLD AUTO: 30 % (ref 33–45)
HGB BLD-MCNC: 9 G/DL (ref 11.5–14.8)
LYMPHOCYTES NFR BLD AUTO: 0.5 /CMM (ref 0.8–4.8)
LYMPHOCYTES NFR BLD AUTO: 9.9 % (ref 20–44)
LYMPHOCYTES NFR BLD MANUAL: 10 % (ref 16–48)
MAGNESIUM SERPL-MCNC: 1.9 MG/DL (ref 1.8–2.4)
MCHC RBC AUTO-ENTMCNC: 30 G/DL (ref 31–36)
MCV RBC AUTO: 68 FL (ref 82–100)
MONOCYTES NFR BLD AUTO: 0.5 /CMM (ref 0.1–1.3)
MONOCYTES NFR BLD AUTO: 9.3 % (ref 2–12)
MONOCYTES NFR BLD MANUAL: 10 % (ref 0–11)
NEUTROPHILS # BLD AUTO: 4 /CMM (ref 1.8–8.9)
NEUTROPHILS NFR BLD AUTO: 77.2 % (ref 43–81)
NEUTS SEG NFR BLD MANUAL: 79 % (ref 42–76)
PHOSPHATE SERPL-MCNC: 2.9 MG/DL (ref 2.5–4.9)
PLATELET # BLD AUTO: 116 /CMM (ref 150–450)
POTASSIUM SERPL-SCNC: 4.1 MMOL/L (ref 3.5–5.1)
RBC # BLD AUTO: 4.42 MIL/UL (ref 4–5.2)
SODIUM SERPL-SCNC: 136 MMOL/L (ref 136–145)
WBC NRBC COR # BLD AUTO: 5.2 K/UL (ref 4.3–11)

## 2020-12-29 RX ADMIN — ACETAMINOPHEN PRN MG: 160 SOLUTION ORAL at 18:49

## 2020-12-29 RX ADMIN — ALBUTEROL SULFATE SCH MG: 2.5 SOLUTION RESPIRATORY (INHALATION) at 08:21

## 2020-12-29 RX ADMIN — ENOXAPARIN SODIUM SCH MG: 40 INJECTION SUBCUTANEOUS at 08:37

## 2020-12-29 RX ADMIN — ALBUTEROL SULFATE SCH MG: 2.5 SOLUTION RESPIRATORY (INHALATION) at 15:51

## 2020-12-29 RX ADMIN — ALBUTEROL SULFATE SCH MG: 2.5 SOLUTION RESPIRATORY (INHALATION) at 23:02

## 2020-12-29 RX ADMIN — CEFEPIME HYDROCHLORIDE SCH MLS/HR: 1 INJECTION, POWDER, FOR SOLUTION INTRAMUSCULAR; INTRAVENOUS at 11:13

## 2020-12-29 RX ADMIN — HYDROCODONE BITARTRATE AND ACETAMINOPHEN PRN TAB: 10; 325 TABLET ORAL at 14:46

## 2020-12-29 RX ADMIN — DEXTROSE MONOHYDRATE SCH MLS/HR: 50 INJECTION, SOLUTION INTRAVENOUS at 00:13

## 2020-12-29 RX ADMIN — Medication SCH MG: at 08:24

## 2020-12-29 RX ADMIN — ALBUTEROL SULFATE SCH MG: 2.5 SOLUTION RESPIRATORY (INHALATION) at 19:37

## 2020-12-29 RX ADMIN — HYDROCODONE BITARTRATE AND ACETAMINOPHEN PRN TAB: 10; 325 TABLET ORAL at 20:24

## 2020-12-29 RX ADMIN — SODIUM CHLORIDE SCH MLS/HR: 9 INJECTION, SOLUTION INTRAVENOUS at 14:29

## 2020-12-29 RX ADMIN — ALBUTEROL SULFATE SCH MG: 2.5 SOLUTION RESPIRATORY (INHALATION) at 05:07

## 2020-12-29 RX ADMIN — Medication SCH MG: at 08:21

## 2020-12-29 RX ADMIN — Medication SCH MG: at 13:21

## 2020-12-29 RX ADMIN — HYDROCODONE BITARTRATE AND ACETAMINOPHEN PRN TAB: 10; 325 TABLET ORAL at 09:13

## 2020-12-29 RX ADMIN — DEXTROSE MONOHYDRATE SCH MLS/HR: 50 INJECTION, SOLUTION INTRAVENOUS at 19:05

## 2020-12-29 RX ADMIN — Medication SCH MG: at 19:36

## 2020-12-29 RX ADMIN — ALBUTEROL SULFATE SCH MG: 2.5 SOLUTION RESPIRATORY (INHALATION) at 13:21

## 2020-12-29 RX ADMIN — HYDROCODONE BITARTRATE AND ACETAMINOPHEN PRN TAB: 10; 325 TABLET ORAL at 04:09

## 2020-12-29 RX ADMIN — PANTOPRAZOLE SODIUM SCH MG: 40 TABLET, DELAYED RELEASE ORAL at 08:24

## 2020-12-29 RX ADMIN — Medication SCH MG: at 15:51

## 2020-12-29 RX ADMIN — Medication SCH MG: at 05:07

## 2020-12-29 RX ADMIN — Medication SCH MG: at 23:02

## 2020-12-29 NOTE — NUR
RN NOTE:



PATIENT RESTING IN BED, NO ACUTE DISTRESS NOTED. BREATHING EVEN AND UNLABORED, NO SOB NOTED. 
MIDLINE TO ANA IN PLACE. WOUNDS TO BLE NOTED. ROPER CATHETER IN PLACE. BED LOCKED AND IN 
LOWEST POSITION, CALL LIGHT IN REACH. WILL ENDORSE TO DAY NURSE TO CONTINUE WITH PLAN OF 
CARE.

## 2020-12-29 NOTE — NUR
RN TELE CLOSING NOTE



PATIENT IS IN BED RESTING COMFORTABLY. PATIENT IS ON OXYGEN NC ON 4L. HOB ELEVATED.  PATIENT 
IS ON CARDIAC MONITOR READING SR 83PATIENT KEPT CLEAN DRY, AND COMFORTABLE THROUGHOUT THE 
SHIFT. WOUND CARE PROVIDED AS ORDERED. CALL LIGHT WITHIN REACH. BED IS IN THE LOWEST 
POSITION WITH SIDES RAILS UP. ENDORSE TO NIGHT SHIFT NURSE FOR KYLIE.

## 2020-12-29 NOTE — NUR
RN OPENING NOTE



PATIENT IS IN BED RESTING COMFORTABLY. PATIENT IS IN NO ACUTE DISTRESS. PATIENTS BREATHING 
IS EVEN AND UNLABORED. PATIENT IS ON NASAL CANNULA ON 4L. PATIENT BED ALARM IS ON. SAFETY 
PRECAUTION IN PLACE. HOB ELEVATED. PATIENTS BED IS LOCKED IN THE LOWEST POSITION. CALL LIGHT 
WITHIN REACH. WILL CONTINUE TO MONITOR.

## 2020-12-29 NOTE — NUR
RN NOTE:



RECEIVE PATIENT FROM ER, NO ACUTE DISTRESS NOTED. BREATHING EVEN AND UNLABORED, NO SOB 
NOTED. MIDLINE TO ANA IN PLACE. WOUNDS TO BLE NOTED. ROPER CATHETER IN PLACE, EMPTY AT THIS 
TIME. ORIENTED PATIENT TO ROOM AND USE OF CALL LIGHT. BED LOCKED AND IN LOWEST POSITION, 
CALL LIGHT IN REACH. WILL CONTINUE TO MONITOR.

## 2020-12-29 NOTE — NUR
RN NOTE:



PATIENT COMPLAINS OF PAIN TO BLE 7/10, NORCO 10/325MG 1 TAB ORAL GIVEN PER MD ORDER. WILL 
CONTINUE TO MONITOR.

## 2020-12-29 NOTE — NUR
RN OPENING NOTES 



PATIENT RECEIVED RESTING IN BED A/O X 4. ON 4L OF O2 WITH BREATHING EVEN AND UNLABORED, NO 
SOB NOTED. NO SIGNS OF ACUTE DISTRESS. SLIGHT COMPLAINTS OF PAIN AND DISCOMFORT AT THE 
MOMENT. MIDLINE LOCATED ON ANA. ROPER CATH NOTED AND IN PLACE. SAFETY PRECAUTIONS IN PLACE 
WITH BED IN LOWEST POSITION, CALL LIGHT WITHIN REACH, BREAKS ON, SIDE RAILS UP.

## 2020-12-30 VITALS — SYSTOLIC BLOOD PRESSURE: 149 MMHG | DIASTOLIC BLOOD PRESSURE: 92 MMHG

## 2020-12-30 VITALS — SYSTOLIC BLOOD PRESSURE: 150 MMHG | DIASTOLIC BLOOD PRESSURE: 96 MMHG

## 2020-12-30 VITALS — DIASTOLIC BLOOD PRESSURE: 86 MMHG | SYSTOLIC BLOOD PRESSURE: 137 MMHG

## 2020-12-30 LAB
BASOPHILS # BLD AUTO: 0 /CMM (ref 0–0.2)
BASOPHILS NFR BLD AUTO: 0.4 % (ref 0–2)
BUN SERPL-MCNC: 12 MG/DL (ref 7–18)
CALCIUM SERPL-MCNC: 8.4 MG/DL (ref 8.5–10.1)
CHLORIDE SERPL-SCNC: 98 MMOL/L (ref 98–107)
CO2 SERPL-SCNC: 37 MMOL/L (ref 21–32)
CREAT SERPL-MCNC: 1 MG/DL (ref 0.6–1.3)
EOSINOPHIL NFR BLD AUTO: 2.5 % (ref 0–6)
GLUCOSE SERPL-MCNC: 96 MG/DL (ref 74–106)
HCT VFR BLD AUTO: 31 % (ref 33–45)
HGB BLD-MCNC: 9.2 G/DL (ref 11.5–14.8)
LYMPHOCYTES NFR BLD AUTO: 0.5 /CMM (ref 0.8–4.8)
LYMPHOCYTES NFR BLD AUTO: 9.5 % (ref 20–44)
MCHC RBC AUTO-ENTMCNC: 29 G/DL (ref 31–36)
MCV RBC AUTO: 69 FL (ref 82–100)
MONOCYTES NFR BLD AUTO: 0.5 /CMM (ref 0.1–1.3)
MONOCYTES NFR BLD AUTO: 9 % (ref 2–12)
NEUTROPHILS # BLD AUTO: 4.1 /CMM (ref 1.8–8.9)
NEUTROPHILS NFR BLD AUTO: 78.6 % (ref 43–81)
PLATELET # BLD AUTO: 107 /CMM (ref 150–450)
POTASSIUM SERPL-SCNC: 4.1 MMOL/L (ref 3.5–5.1)
RBC # BLD AUTO: 4.54 MIL/UL (ref 4–5.2)
SODIUM SERPL-SCNC: 136 MMOL/L (ref 136–145)
WBC NRBC COR # BLD AUTO: 5.3 K/UL (ref 4.3–11)

## 2020-12-30 PROCEDURE — 0HBRXZZ EXCISION OF TOE NAIL, EXTERNAL APPROACH: ICD-10-PCS | Performed by: STUDENT IN AN ORGANIZED HEALTH CARE EDUCATION/TRAINING PROGRAM

## 2020-12-30 RX ADMIN — ALBUTEROL SULFATE SCH MG: 2.5 SOLUTION RESPIRATORY (INHALATION) at 20:56

## 2020-12-30 RX ADMIN — Medication SCH MG: at 09:00

## 2020-12-30 RX ADMIN — HYDROCODONE BITARTRATE AND ACETAMINOPHEN PRN TAB: 10; 325 TABLET ORAL at 11:56

## 2020-12-30 RX ADMIN — Medication SCH MG: at 11:19

## 2020-12-30 RX ADMIN — HYDROCODONE BITARTRATE AND ACETAMINOPHEN PRN TAB: 10; 325 TABLET ORAL at 18:32

## 2020-12-30 RX ADMIN — ACETAMINOPHEN PRN MG: 160 SOLUTION ORAL at 01:06

## 2020-12-30 RX ADMIN — ENOXAPARIN SODIUM SCH MG: 40 INJECTION SUBCUTANEOUS at 09:00

## 2020-12-30 RX ADMIN — Medication SCH MG: at 16:10

## 2020-12-30 RX ADMIN — ALBUTEROL SULFATE SCH MG: 2.5 SOLUTION RESPIRATORY (INHALATION) at 08:04

## 2020-12-30 RX ADMIN — Medication SCH MG: at 08:04

## 2020-12-30 RX ADMIN — PANTOPRAZOLE SODIUM SCH MG: 40 TABLET, DELAYED RELEASE ORAL at 09:00

## 2020-12-30 RX ADMIN — HYDROCODONE BITARTRATE AND ACETAMINOPHEN PRN TAB: 10; 325 TABLET ORAL at 02:29

## 2020-12-30 RX ADMIN — ACETAMINOPHEN PRN MG: 160 SOLUTION ORAL at 23:08

## 2020-12-30 RX ADMIN — ACETAMINOPHEN PRN MG: 160 SOLUTION ORAL at 15:10

## 2020-12-30 RX ADMIN — ALBUTEROL SULFATE SCH MG: 2.5 SOLUTION RESPIRATORY (INHALATION) at 23:36

## 2020-12-30 RX ADMIN — ALBUTEROL SULFATE SCH MG: 2.5 SOLUTION RESPIRATORY (INHALATION) at 16:10

## 2020-12-30 RX ADMIN — HYDROCODONE BITARTRATE AND ACETAMINOPHEN PRN TAB: 10; 325 TABLET ORAL at 07:39

## 2020-12-30 RX ADMIN — CEFEPIME HYDROCHLORIDE SCH MLS/HR: 1 INJECTION, POWDER, FOR SOLUTION INTRAMUSCULAR; INTRAVENOUS at 10:29

## 2020-12-30 RX ADMIN — ALBUTEROL SULFATE SCH MG: 2.5 SOLUTION RESPIRATORY (INHALATION) at 11:19

## 2020-12-30 RX ADMIN — DEXTROSE MONOHYDRATE SCH MLS/HR: 50 INJECTION, SOLUTION INTRAVENOUS at 12:06

## 2020-12-30 RX ADMIN — Medication SCH MG: at 23:35

## 2020-12-30 RX ADMIN — Medication SCH MG: at 03:17

## 2020-12-30 RX ADMIN — Medication PRN MG: at 18:32

## 2020-12-30 RX ADMIN — Medication SCH MG: at 20:55

## 2020-12-30 RX ADMIN — ALBUTEROL SULFATE SCH MG: 2.5 SOLUTION RESPIRATORY (INHALATION) at 03:17

## 2020-12-30 NOTE — NUR
MS/RN NOTE



Patient in bed, A&O x 4. No complaints of pain and discomfort at this time. Breathing even 
and non-labored on nocturnal BIPAP. No cardiac distress noted. ANA midline access noted, 
patent and intact, and flushing well. Godfrey in place, draining clear yellow urine well. Fall 
precautions maintained. Endorsed to JUSTIN Prather for KYLIE

## 2020-12-30 NOTE — NUR
MS/RN OPENING NOTE



Received patient resting in bed, A&O x 4. No complaints of pain and discomfort at this time. 
Breathing even and non-labored on 4L oxygen via NC. No cardiac distress noted. ANA midline 
access noted, patent and intact, and flushing well. Godfrey in place, draining clear yellow 
urine well. Bed locked to its lowest position, side rails x 2 up, call light in hand. Will 
continue with current medical management.

## 2020-12-30 NOTE — NUR
RN CLOSING NOTES 



PATIENT RESTING IN BED A/O X 4. ON 4L OF O2 WITH BREATHING EVEN AND UNLABORED, NO SOB NOTED. 
NO SIGNS OF ACUTE DISTRESS. SLIGHT COMPLAINTS OF PAIN AND DISCOMFORT AT THE MOMENT. MIDLINE 
LOCATED ON ANA. ROPER CATH NOTED AND IN PLACE. SAFETY PRECAUTIONS IN PLACE WITH BED IN 
LOWEST POSITION, CALL LIGHT WITHIN REACH, BREAKS ON, SIDE RAILS UP. ALL NEEDS ATTENDED TO 
THROUGHOUT THE NIGHT. WILL ENDORSE TO AM SHIFT ABOUT KYLIE.

## 2020-12-31 VITALS — DIASTOLIC BLOOD PRESSURE: 71 MMHG | SYSTOLIC BLOOD PRESSURE: 124 MMHG

## 2020-12-31 VITALS — SYSTOLIC BLOOD PRESSURE: 166 MMHG | DIASTOLIC BLOOD PRESSURE: 91 MMHG

## 2020-12-31 VITALS — DIASTOLIC BLOOD PRESSURE: 79 MMHG | SYSTOLIC BLOOD PRESSURE: 129 MMHG

## 2020-12-31 VITALS — DIASTOLIC BLOOD PRESSURE: 97 MMHG | SYSTOLIC BLOOD PRESSURE: 153 MMHG

## 2020-12-31 VITALS — SYSTOLIC BLOOD PRESSURE: 141 MMHG | DIASTOLIC BLOOD PRESSURE: 97 MMHG

## 2020-12-31 LAB
BASOPHILS # BLD AUTO: 0 /CMM (ref 0–0.2)
BASOPHILS NFR BLD AUTO: 0.2 % (ref 0–2)
BUN SERPL-MCNC: 12 MG/DL (ref 7–18)
CALCIUM SERPL-MCNC: 8.5 MG/DL (ref 8.5–10.1)
CHLORIDE SERPL-SCNC: 99 MMOL/L (ref 98–107)
CO2 SERPL-SCNC: 36 MMOL/L (ref 21–32)
CREAT SERPL-MCNC: 1 MG/DL (ref 0.6–1.3)
EOSINOPHIL NFR BLD AUTO: 4.1 % (ref 0–6)
GLUCOSE SERPL-MCNC: 87 MG/DL (ref 74–106)
HCT VFR BLD AUTO: 31 % (ref 33–45)
HGB BLD-MCNC: 9.2 G/DL (ref 11.5–14.8)
LYMPHOCYTES NFR BLD AUTO: 0.5 /CMM (ref 0.8–4.8)
LYMPHOCYTES NFR BLD AUTO: 11.4 % (ref 20–44)
MCHC RBC AUTO-ENTMCNC: 29 G/DL (ref 31–36)
MCV RBC AUTO: 70 FL (ref 82–100)
MONOCYTES NFR BLD AUTO: 0.4 /CMM (ref 0.1–1.3)
MONOCYTES NFR BLD AUTO: 9 % (ref 2–12)
NEUTROPHILS # BLD AUTO: 3.1 /CMM (ref 1.8–8.9)
NEUTROPHILS NFR BLD AUTO: 75.3 % (ref 43–81)
PLATELET # BLD AUTO: 125 /CMM (ref 150–450)
POTASSIUM SERPL-SCNC: 4.2 MMOL/L (ref 3.5–5.1)
RBC # BLD AUTO: 4.5 MIL/UL (ref 4–5.2)
SODIUM SERPL-SCNC: 137 MMOL/L (ref 136–145)
WBC NRBC COR # BLD AUTO: 4.1 K/UL (ref 4.3–11)

## 2020-12-31 RX ADMIN — Medication SCH MG: at 11:11

## 2020-12-31 RX ADMIN — Medication SCH MG: at 08:44

## 2020-12-31 RX ADMIN — ALBUTEROL SULFATE SCH MG: 2.5 SOLUTION RESPIRATORY (INHALATION) at 07:34

## 2020-12-31 RX ADMIN — ALBUTEROL SULFATE SCH MG: 2.5 SOLUTION RESPIRATORY (INHALATION) at 11:11

## 2020-12-31 RX ADMIN — ALBUTEROL SULFATE SCH MG: 2.5 SOLUTION RESPIRATORY (INHALATION) at 04:05

## 2020-12-31 RX ADMIN — HYDROCODONE BITARTRATE AND ACETAMINOPHEN PRN TAB: 10; 325 TABLET ORAL at 16:22

## 2020-12-31 RX ADMIN — HYDROCODONE BITARTRATE AND ACETAMINOPHEN PRN TAB: 10; 325 TABLET ORAL at 08:52

## 2020-12-31 RX ADMIN — PANTOPRAZOLE SODIUM SCH MG: 40 TABLET, DELAYED RELEASE ORAL at 08:44

## 2020-12-31 RX ADMIN — Medication SCH MG: at 15:21

## 2020-12-31 RX ADMIN — Medication SCH MG: at 23:09

## 2020-12-31 RX ADMIN — ALBUTEROL SULFATE SCH MG: 2.5 SOLUTION RESPIRATORY (INHALATION) at 20:11

## 2020-12-31 RX ADMIN — DEXTROSE MONOHYDRATE SCH MLS/HR: 50 INJECTION, SOLUTION INTRAVENOUS at 06:00

## 2020-12-31 RX ADMIN — ALBUTEROL SULFATE SCH MG: 2.5 SOLUTION RESPIRATORY (INHALATION) at 15:21

## 2020-12-31 RX ADMIN — HYDROCODONE BITARTRATE AND ACETAMINOPHEN PRN TAB: 10; 325 TABLET ORAL at 02:29

## 2020-12-31 RX ADMIN — HYDROCODONE BITARTRATE AND ACETAMINOPHEN PRN TAB: 10; 325 TABLET ORAL at 02:43

## 2020-12-31 RX ADMIN — Medication SCH MG: at 20:11

## 2020-12-31 RX ADMIN — ALBUTEROL SULFATE SCH MG: 2.5 SOLUTION RESPIRATORY (INHALATION) at 23:09

## 2020-12-31 RX ADMIN — ENOXAPARIN SODIUM SCH MG: 40 INJECTION SUBCUTANEOUS at 08:46

## 2020-12-31 RX ADMIN — HYDROCODONE BITARTRATE AND ACETAMINOPHEN PRN TAB: 10; 325 TABLET ORAL at 20:56

## 2020-12-31 RX ADMIN — Medication SCH MG: at 07:34

## 2020-12-31 RX ADMIN — CEFEPIME HYDROCHLORIDE SCH MLS/HR: 1 INJECTION, POWDER, FOR SOLUTION INTRAMUSCULAR; INTRAVENOUS at 11:00

## 2020-12-31 RX ADMIN — Medication SCH MG: at 04:05
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
## 2020-12-31 NOTE — NUR
RN NOTES



Followed up CM for patient's  status. CM contacted pt's insurance for transportation 
arrangement. Per CM, insurance will find a transportation with bariat[facility] serge and will 
give a call to [facility]. Awaiting for transportation information at this time.
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
## 2020-12-31 NOTE — NUR
TELE RN CLOSING NOTES

A/O X 4 ON 4L OF NASAL CANNULA. NO IV ASSESS PER PAULINE, NP AWARE. DISCHARGE PAPER WORK 
COMPLETED. PATIENT KEPT CLEAN AND DRY. ALL NEEDS, CARE, TREATMENT, AND MEDICATIONS WERE 
ADMINISTERED AS ANTICIPATED PER ORDER. SAFETY MEASURES ARE APPLIED, BED IS IN LOW POSITION 
SIDE RAILS UP X 2. CALL LIGHT WITHIN REACH WILL ENDORSE TO THE NIGHT SHIFT NURSE.

## 2020-12-31 NOTE — NUR
CALLED [facility] TO GIVE REPORT ON PATIENT, SPOKE WITH KAYLENE ANDERSON PATIENT WILL BE 
GOING TO ROOM 104A WILL ENDORSE TO THE NIGHT SHIFT NURSE.

## 2021-01-01 VITALS — SYSTOLIC BLOOD PRESSURE: 157 MMHG | DIASTOLIC BLOOD PRESSURE: 84 MMHG

## 2021-01-01 RX ADMIN — Medication SCH MG: at 03:42

## 2021-01-01 RX ADMIN — HYDROCODONE BITARTRATE AND ACETAMINOPHEN PRN TAB: 10; 325 TABLET ORAL at 03:09

## 2021-01-01 RX ADMIN — ALBUTEROL SULFATE SCH MG: 2.5 SOLUTION RESPIRATORY (INHALATION) at 03:42

## 2021-01-01 NOTE — NUR
RN NOTES



Pt complaining discomfort on FC. Removed accordingly. Pt claimed relief from discomfort. 
Will continue to monitor accordingly.

## 2021-01-01 NOTE — NUR
RN NOTES



Followed up call to [facility], automated machine and on hold for 20 minutes. Pt still not 
picked up.

## 2021-01-01 NOTE — NUR
RN DISCHARGE NOTES



[facility] transport at bedside. Pt on [facility]. Attached to O@, denies any discomfort. [facility] notified, spoke to nurse Solange. Discharge papers endorsed to EMT. Pt's belongings 
accounted for. Pt left the facility at this time.

## 2021-08-25 NOTE — NUR
LAB CALLED CO2 IS 42. CALLED [facility] FOR KERZUMA PAGED. Patient had labs done outside of [place] and was told her BUN level is elevated. She was told that she couldn't have the medication Meloxicam refilled until after speaking to her PCP. Please call to discuss.